# Patient Record
Sex: FEMALE | URBAN - METROPOLITAN AREA
[De-identification: names, ages, dates, MRNs, and addresses within clinical notes are randomized per-mention and may not be internally consistent; named-entity substitution may affect disease eponyms.]

---

## 2017-01-20 ENCOUNTER — EMERGENCY (EMERGENCY)
Facility: HOSPITAL | Age: 82
LOS: 1 days | Discharge: HOME | End: 2017-01-20
Attending: EMERGENCY MEDICINE | Admitting: EMERGENCY MEDICINE
Payer: MEDICARE

## 2017-01-20 VITALS
SYSTOLIC BLOOD PRESSURE: 199 MMHG | DIASTOLIC BLOOD PRESSURE: 119 MMHG | HEIGHT: 61 IN | OXYGEN SATURATION: 98 % | HEART RATE: 74 BPM | RESPIRATION RATE: 20 BRPM | TEMPERATURE: 98 F | WEIGHT: 95.9 LBS

## 2017-01-20 DIAGNOSIS — M79.662 PAIN IN LEFT LOWER LEG: ICD-10-CM

## 2017-01-20 DIAGNOSIS — F17.210 NICOTINE DEPENDENCE, CIGARETTES, UNCOMPLICATED: ICD-10-CM

## 2017-01-20 DIAGNOSIS — I10 ESSENTIAL (PRIMARY) HYPERTENSION: ICD-10-CM

## 2017-01-20 DIAGNOSIS — S80.812A ABRASION, LEFT LOWER LEG, INITIAL ENCOUNTER: ICD-10-CM

## 2017-01-20 DIAGNOSIS — Z88.2 ALLERGY STATUS TO SULFONAMIDES: ICD-10-CM

## 2017-01-20 PROCEDURE — 99282 EMERGENCY DEPT VISIT SF MDM: CPT

## 2017-01-20 NOTE — ED PROVIDER NOTE - MEDICAL DECISION MAKING DETAILS
Advise daily local wound care with wet to dry dressing. Supplies and instructions provided. Pt instructed to resume BP meds immediately upon return home. F/U with PMD within week. LLE slow healing would. No signs of acute infection. Advise daily local wound care with wet to dry dressing. Supplies and instructions provided. Pt instructed to resume BP meds immediately upon return home. F/U with PMD within week.

## 2017-01-20 NOTE — ED PROVIDER NOTE - NS ED MD SCRIBE ATTENDING SCRIBE SECTIONS
HISTORY OF PRESENT ILLNESS/HIV/REVIEW OF SYSTEMS/VITAL SIGNS( Pullset)/PHYSICAL EXAM/PAST MEDICAL/SURGICAL/SOCIAL HISTORY/INTAKE ASSESSMENT/SCREENINGS

## 2017-01-20 NOTE — ED PROVIDER NOTE - SKIN, MLM
5x2.5cm oval-shaped skin deficit to left posterior lower leg, with good granulation tissue and a small amount of beige exudates. Small amount of redness surrounding wound edge. No warmth or signs of cellulitis.

## 2017-01-20 NOTE — ED PROVIDER NOTE - OBJECTIVE STATEMENT
82y F Pt with PMHx of CVA and HTN presents to ED with wound to back of left lower leg. Pt states she scratched her leg going down steps 3 weeks ago. Wound with surrounding erythema and worsening pain. Pt non-compliant with BP medication for the past 2 weeks. Pt states she cannot remember the name or doses of her BP meds. Active everyday smoker. Pt takes baby ASA daily. Denies fever.

## 2017-01-20 NOTE — ED ADULT TRIAGE NOTE - CHIEF COMPLAINT QUOTE
Pt complaining of wound to back of left foot. Pt states she scratched her foot three weeks ago going down the stairs

## 2023-05-16 NOTE — ED PROVIDER NOTE - CHPI ED SYMPTOMS POS
PAIN Carac Counseling:  I discussed with the patient the risks of Carac including but not limited to erythema, scaling, itching, weeping, crusting, and pain.